# Patient Record
Sex: FEMALE
[De-identification: names, ages, dates, MRNs, and addresses within clinical notes are randomized per-mention and may not be internally consistent; named-entity substitution may affect disease eponyms.]

---

## 2017-06-12 ENCOUNTER — APPOINTMENT (OUTPATIENT)
Dept: OTOLARYNGOLOGY | Facility: CLINIC | Age: 37
End: 2017-06-12

## 2017-06-12 PROBLEM — Z00.00 ENCOUNTER FOR PREVENTIVE HEALTH EXAMINATION: Status: ACTIVE | Noted: 2017-06-12

## 2019-12-12 ENCOUNTER — OFFICE VISIT (OUTPATIENT)
Dept: URGENT CARE | Facility: CLINIC | Age: 39
End: 2019-12-12
Payer: COMMERCIAL

## 2019-12-12 ENCOUNTER — APPOINTMENT (OUTPATIENT)
Dept: RADIOLOGY | Facility: CLINIC | Age: 39
End: 2019-12-12
Payer: COMMERCIAL

## 2019-12-12 VITALS
RESPIRATION RATE: 16 BRPM | OXYGEN SATURATION: 100 % | HEART RATE: 105 BPM | SYSTOLIC BLOOD PRESSURE: 106 MMHG | DIASTOLIC BLOOD PRESSURE: 70 MMHG | TEMPERATURE: 98.3 F | WEIGHT: 132 LBS | BODY MASS INDEX: 20.72 KG/M2 | HEIGHT: 67 IN

## 2019-12-12 DIAGNOSIS — R07.89 CHEST DISCOMFORT: ICD-10-CM

## 2019-12-12 DIAGNOSIS — J02.9 SORE THROAT: Primary | ICD-10-CM

## 2019-12-12 LAB — S PYO AG THROAT QL: NEGATIVE

## 2019-12-12 PROCEDURE — 99204 OFFICE O/P NEW MOD 45 MIN: CPT | Performed by: PHYSICIAN ASSISTANT

## 2019-12-12 PROCEDURE — 71046 X-RAY EXAM CHEST 2 VIEWS: CPT

## 2019-12-12 PROCEDURE — 87070 CULTURE OTHR SPECIMN AEROBIC: CPT | Performed by: PHYSICIAN ASSISTANT

## 2019-12-12 PROCEDURE — 87880 STREP A ASSAY W/OPTIC: CPT | Performed by: PHYSICIAN ASSISTANT

## 2019-12-12 NOTE — PROGRESS NOTES
[facility]        NAME: Pricilla Goldberg is a 42 y o  female  : 1980    MRN: [id number]  DATE: 2019  TIME: 6:33 PM    Assessment and Plan   Sore throat [J02 9]  1  Sore throat  Throat culture    POCT rapid strepA   2  Chest discomfort  XR chest pa & lateral         Patient Instructions     Patient Instructions   1  Sore throat/Chest discomfort  -Rapid strep is negative and throat culture is pending  -CXR as reviewed by myself is negative  -Patient declines EKG at this time  -Please go over to the family practice to make a follow-up appointment    Go to ER with worsening symptoms, fever, worsening pain, shortness of breath, difficulty swallowing or any signs of distress     Follow up with PCP in 3-5 days  Proceed to  ER if symptoms worsen  Chief Complaint     Chief Complaint   Patient presents with    Sore Throat     x 2 weeks    Generalized Body Aches     x 1 week, c/o chest pain and body aches         History of Present Illness       Patient is a 45-year-old female who presents today for evaluation of a sore throat and centralized chest discomfort that has been intermittent for the past 2 weeks  Patient states that when she was pregnant 2 years ago, she was diagnosed with a throat infection however she states that it was never treated with antibiotics because she was pregnant at the time  Patient states that she never followed up with her ENT doctor afterward  Patient states that since the end of October, she has been having the throat discomfort again which has gotten worse over the past 2 weeks  She also admits to having intermittent a body aches as well  Patient states that she just moved here from [place] a couple months ago and does not currently have a primary care physician  She is requesting blood work at this time  Patient denies chance of pregnancy  Review of Systems   Review of Systems   Constitutional: Negative for chills and fever     HENT: Positive for sore throat  Negative for ear pain and rhinorrhea  Eyes: Negative for visual disturbance  Respiratory: Negative for shortness of breath  Cardiovascular: Positive for chest pain  Negative for palpitations  Gastrointestinal: Negative for abdominal pain  Musculoskeletal: Negative for arthralgias  Neurological: Negative for headaches  All other systems reviewed and are negative  Current Medications     No current outpatient medications on file  Current Allergies     Allergies as of 12/12/2019 - Reviewed 12/12/2019   Allergen Reaction Noted    Ciprofloxacin Hives 12/12/2019            The following portions of the patient's history were reviewed and updated as appropriate: allergies, current medications, past family history, past medical history, past social history, past surgical history and problem list      History reviewed  No pertinent past medical history  Past Surgical History:   Procedure Laterality Date    APPENDECTOMY         History reviewed  No pertinent family history  Medications have been verified  Objective   /70   Pulse 105   Temp 98 3 °F (36 8 °C) (Temporal)   Resp 16   Ht 5' 7" (1 702 m)   Wt 59 9 kg (132 lb)   SpO2 100%   Breastfeeding? Yes   BMI 20 67 kg/m²        Physical Exam     Physical Exam   Constitutional: She is oriented to person, place, and time  She appears well-developed and well-nourished  No distress  HENT:   Head: Normocephalic and atraumatic  Right Ear: Tympanic membrane and ear canal normal    Left Ear: Tympanic membrane and ear canal normal    Mouth/Throat: Uvula is midline, oropharynx is clear and moist and mucous membranes are normal  No uvula swelling  No oropharyngeal exudate, posterior oropharyngeal edema or posterior oropharyngeal erythema  Eyes: Pupils are equal, round, and reactive to light  EOM are normal    Neck: Normal range of motion  Cardiovascular: Normal rate, regular rhythm and normal heart sounds  Pulmonary/Chest: Effort normal and breath sounds normal        Lymphadenopathy:     She has no cervical adenopathy  Neurological: She is alert and oriented to person, place, and time  Skin: Skin is warm and dry  Psychiatric: She has a normal mood and affect  Nursing note and vitals reviewed

## 2019-12-12 NOTE — PATIENT INSTRUCTIONS
1  Sore throat/Chest discomfort  -Rapid strep is negative and throat culture is pending  -CXR as reviewed by myself is negative  -Patient declines EKG at this time  -Please go over to the family practice to make a follow-up appointment    Go to ER with worsening symptoms, fever, worsening pain, shortness of breath, difficulty swallowing or any signs of distress

## 2019-12-14 LAB — BACTERIA THROAT CULT: NORMAL

## 2021-03-09 ENCOUNTER — EVALUATION (OUTPATIENT)
Dept: PHYSICAL THERAPY | Facility: CLINIC | Age: 41
End: 2021-03-09
Payer: COMMERCIAL

## 2021-03-09 DIAGNOSIS — M94.0 COSTOCHONDRITIS: ICD-10-CM

## 2021-03-09 DIAGNOSIS — G58.8 INTERCOSTAL NEURALGIA: Primary | ICD-10-CM

## 2021-03-09 DIAGNOSIS — M54.6 ACUTE THORACIC BACK PAIN, UNSPECIFIED BACK PAIN LATERALITY: ICD-10-CM

## 2021-03-09 PROCEDURE — 97140 MANUAL THERAPY 1/> REGIONS: CPT | Performed by: PHYSICAL THERAPIST

## 2021-03-09 PROCEDURE — 97110 THERAPEUTIC EXERCISES: CPT | Performed by: PHYSICAL THERAPIST

## 2021-03-09 PROCEDURE — 97161 PT EVAL LOW COMPLEX 20 MIN: CPT | Performed by: PHYSICAL THERAPIST

## 2021-03-09 NOTE — LETTER
March 10, 2021    Eugenio Agosto ticketea    Patient: Pricilla Goldberg   YOB: 1980   Date of Visit: 3/9/2021     Encounter Diagnosis     ICD-10-CM    1  Intercostal neuralgia  G58 8    2  Costochondritis  M94 0    3  Acute thoracic back pain, unspecified back pain laterality  M54 6        Dear Dr Shannon Workman:    Thank you for your recent referral of Pricilla Goldberg  Please review the attached evaluation summary from [facility] recent visit  Please verify that you agree with the plan of care by signing the attached order  If you have any questions or concerns, please do not hesitate to call  I sincerely appreciate the opportunity to share in the care of one of your patients and hope to have another opportunity to work with you in the near future  Sincerely,    Nicolas Valles, PT      Referring Provider:      I certify that I have read the below Plan of Care and certify the need for these services furnished under this plan of treatment while under my care  Eugenio Agosto ticketea  Via Fax: [phone]          PT Evaluation     Today's date: 3/9/2021  Patient name: Pricilla Goldberg  : 1980  MRN: [id number]  Referring provider: Laron Harrell  Dx:   Encounter Diagnosis     ICD-10-CM    1  Intercostal neuralgia  G58 8    2  Costochondritis  M94 0    3  Acute thoracic back pain, unspecified back pain laterality  M54 6                   Assessment  Assessment details: Pricilla Goldberg is a 47 y o  female presenting as an outpatient to [place] [address] PT w/ c/o t/s and intercostal/anterior rib pain  In addition to pain, pt presents w/ s/s of 1st rib impingement syndrome, hypertonicity of surrounding musculature, and decreased ROMsignificantly limiting pt's functional ability  Pt will benefit from skilled PT services to address the above deficits in order to max function to allow pt to achieve goals in PT    Thank you for the referral of this pt  Impairments: abnormal muscle tone, abnormal or restricted ROM, activity intolerance, impaired physical strength, lacks appropriate home exercise program and pain with function  Barriers to therapy: High co-pay may limit frequency of tx  Understanding of Dx/Px/POC: good   Prognosis: good    Goals  ST  Pain decreased by 25% in 4-6 weeks  2  ROM increased by 25% in 4-6 weeks  LT  Decrease pain to 1-2/10 at worst by d/c   2  Increase ROM to [facility] for all deficient movements by d/c   3  IADL performance increased to max function by d/c   4  Recreational performance increased to max function by d/c  Plan  Planned modality interventions: cryotherapy and TENS  Other planned modality interventions: other modalities PRN  Planned therapy interventions: abdominal trunk stabilization, ADL retraining, IADL retraining, flexibility, functional ROM exercises, graded exercise, home exercise program, manual therapy, joint mobilization, neuromuscular re-education, patient education, postural training, strengthening, therapeutic exercise and therapeutic activities  Other planned therapy interventions: other interventions PRN  Frequency: 1-2x/week  Duration in weeks: 6  Plan of Care beginning date: 3/9/2021  Plan of Care expiration date: 2021  Treatment plan discussed with: patient        Subjective Evaluation    History of Present Illness  Date of onset: 2021  Mechanism of injury: Pt reports having t/s and intercostal/anterior rib  Pain  States that pain started in t/s during acupuncture which wrapped into anterior thoracic area just below breast       She reports that she started to feel tightness along anterior ribs which began approximately 4 days following acupuncture  She reports that pain feels like nerve pain  Pt reports intermittently feeling "mild tingling" into b/l UE usually if sleeping in supine or s/l     Pain  Pain scale: anterior rib: 3/10; t/s: 5/10   Pain scale at highest: anterior rib: 7/10; t/s: 5/10  Location: anterior ribs/intercostal space and t/s  Relieving factors: heat and medications (ibuprofen 600 mg 2x/day; muscle relaxer)  Exacerbated by: sitting > few minutes, standing > 15-20 minutes, walking, sleeping, lifting  Social Support  Lives with: spouse (3 kids)    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)  Patient Goals  Patient goals for therapy: decreased pain          Objective     Active Range of Motion   Cervical/Thoracic Spine       Cervical    Subcranial retraction:  WFL   Flexion: 56 degrees   Extension: 42 degrees      Left lateral flexion:  WFL  Right lateral flexion:  WFL  Left rotation: Neck active rotation left: *R c/s discomfort  Restriction level: minimal  Right rotation:  [facility]    Thoracic    Flexion: Active thoracic flexion: *increased anterior rib pain  Restriction level: minimal  Extension:  Restriction level: maximal  Left rotation:  Restriction level: moderate  Right rotation: Active right thoracic rotation: *min discomfort in t/s    Restriction level: minimal    Additional Active Range of Motion Details    Gross Shoulder AROM (R/L):   Flexion: WFL/WFL  Abduction: WFL/FWL  IR: WFL/WFL  ER: WFL/WFL *t/s pain      General Comments:      Cervical/Thoracic Comments  Palpation: Hypertonicity/Tenderness w/ palpation to LUT worse vs RUT and b/l t/s psp    c/s special tests:   sharp-monie: negative  vertebral artery: negative  spurlings: negative  Shelia's: positive pain/restriction    Spring Testing: positive hypomobility/tenderness t/o t/s         Daily Treatment Diary    EPO: 4/20/2021  Precautions: None  Co- Morbidities: Hx of panic attack    Manuals 3/9            P/a mobs to t/s Grade V            TPR to b/l t/s psp NV            1st rib MWM NV            Total Time 8'            There Ex    HEP instruct/handout 8'            T/s rotations             T/s extension over 1/2 foam roller             Extension stretch over pball             pec stretch             Neuro-Re-ed    DLS: isometric abdominals- TA             DLS: fall outs w/ TA contraction             DLS: marches w/ TA contraction             Pball PF recruitment                                                                 Ther Activity                              Gait Training                              Modalities    CP PRN Home if needed

## 2021-03-09 NOTE — PROGRESS NOTES
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, hypertonicity of surrounding musculature, and decreased ROMsignificantly limiting pt's functional ability  Pt will benefit from skilled PT services to address the above deficits in order to max function to allow pt to achieve goals in PT  Thank you for the referral of this pt  Impairments: abnormal muscle tone, abnormal or restricted ROM, activity intolerance, impaired physical strength, lacks appropriate home exercise program and pain with function  Barriers to therapy: High co-pay may limit frequency of tx  Understanding of Dx/Px/POC: good   Prognosis: good    Goals  ST  Pain decreased by 25% in 4-6 weeks  2  ROM increased by 25% in 4-6 weeks  LT  Decrease pain to 1-2/10 at worst by d/c   2  Increase ROM to [facility] for all deficient movements by d/c   3  IADL performance increased to max function by d/c   4  Recreational performance increased to max function by d/c        Plan  Planned modality interventions: cryotherapy and TENS  Other planned modality interventions: other modalities PRN  Planned therapy interventions: abdominal trunk stabilization, ADL retraining, IADL retraining, flexibility, functional ROM exercises, graded exercise, home exercise program, manual therapy, joint mobilization, neuromuscular re-education, patient education, postural training, strengthening, therapeutic exercise and therapeutic activities  Other planned therapy interventions: other interventions PRN  Frequency: 1-2x/week  Duration in weeks: 6  Plan of Care beginning date: 3/9/2021  Plan of Care expiration date: 4/20/2021  Treatment plan discussed with: patient        Subjective Evaluation    History of Present Illness  Date of onset: 2/4/2021  Mechanism of injury: Pt reports having t/s and intercostal/anterior rib  Pain  States that pain started in t/s during acupuncture which wrapped into anterior thoracic area just below breast       She reports that she started to feel tightness along anterior ribs which began approximately 4 days following acupuncture  She reports that pain feels like nerve pain  Pt reports intermittently feeling "mild tingling" into b/l UE usually if sleeping in supine or s/l  Pain  Pain scale: anterior rib: 3/10; t/s: 5/10  Pain scale at highest: anterior rib: 7/10; t/s: 5/10  Location: anterior ribs/intercostal space and t/s  Relieving factors: heat and medications (ibuprofen 600 mg 2x/day; muscle relaxer)  Exacerbated by: sitting > few minutes, standing > 15-20 minutes, walking, sleeping, lifting  Social Support  Lives with: spouse (3 kids)    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)  Patient Goals  Patient goals for therapy: decreased pain          Objective     Active Range of Motion   Cervical/Thoracic Spine       Cervical    Subcranial retraction:  WFL   Flexion: 56 degrees   Extension: 42 degrees      Left lateral flexion:  WFL  Right lateral flexion:  WFL  Left rotation: Neck active rotation left: *R c/s discomfort  Restriction level: minimal  Right rotation:  [facility]    Thoracic    Flexion: Active thoracic flexion: *increased anterior rib pain  Restriction level: minimal  Extension:  Restriction level: maximal  Left rotation:  Restriction level: moderate  Right rotation: Active right thoracic rotation: *min discomfort in t/s    Restriction level: minimal    Additional Active Range of Motion Details    Gross Shoulder AROM (R/L):   Flexion: WFL/WFL  Abduction: WFL/FWL  IR: WFL/WFL  ER: WFL/WFL *t/s pain      General Comments:      Cervical/Thoracic Comments  Palpation: Hypertonicity/Tenderness w/ palpation to LUT worse vs RUT and b/l t/s psp    c/s special tests:   sharp-monie: negative  vertebral artery: negative  spurlings: negative  Shelia's: positive pain/restriction    Spring Testing: positive hypomobility/tenderness t/o t/s         Daily Treatment Diary    EPOC: 4/20/2021  Precautions: None  Co- Morbidities: Hx of panic attack    Manuals 3/9            P/a mobs to t/s Grade V            TPR to b/l t/s psp NV            1st rib MWM NV            Total Time 8'            There Ex    HEP instruct/handout 8'            T/s rotations             T/s extension over 1/2 foam roller             Extension stretch over pball             pec stretch             Neuro-Re-ed    DLS: isometric abdominals- TA             DLS: fall outs w/ TA contraction             DLS: marches w/ TA contraction             Pball PF recruitment                                                                 Ther Activity                              Gait Training                              Modalities    CP PRN Home if needed

## 2021-03-10 ENCOUNTER — TRANSCRIBE ORDERS (OUTPATIENT)
Dept: PHYSICAL THERAPY | Facility: CLINIC | Age: 41
End: 2021-03-10

## 2021-03-10 DIAGNOSIS — G58.8 INTERCOSTAL NEURALGIA: Primary | ICD-10-CM

## 2021-03-11 ENCOUNTER — OFFICE VISIT (OUTPATIENT)
Dept: PHYSICAL THERAPY | Facility: CLINIC | Age: 41
End: 2021-03-11
Payer: COMMERCIAL

## 2021-03-11 DIAGNOSIS — M94.0 COSTOCHONDRITIS: ICD-10-CM

## 2021-03-11 DIAGNOSIS — M54.6 ACUTE THORACIC BACK PAIN, UNSPECIFIED BACK PAIN LATERALITY: ICD-10-CM

## 2021-03-11 DIAGNOSIS — G58.8 INTERCOSTAL NEURALGIA: Primary | ICD-10-CM

## 2021-03-11 PROCEDURE — 97140 MANUAL THERAPY 1/> REGIONS: CPT | Performed by: PHYSICAL THERAPIST

## 2021-03-11 PROCEDURE — 97112 NEUROMUSCULAR REEDUCATION: CPT | Performed by: PHYSICAL THERAPIST

## 2021-03-11 PROCEDURE — 97110 THERAPEUTIC EXERCISES: CPT | Performed by: PHYSICAL THERAPIST

## 2021-03-11 NOTE — PROGRESS NOTES
Daily Note     Today's date: 3/11/2021  Patient name: Marie Mora  : 1980  MRN: [id number]  Referring provider: Juan Pina  Dx:   Encounter Diagnosis     ICD-10-CM    1  Intercostal neuralgia  G58 8    2  Costochondritis  M94 0    3  Acute thoracic back pain, unspecified back pain laterality  M54 6                   Subjective: Pt reports compliance w/ HEP and states that stretches seem to help anterior rib pain  Objective: See treatment diary below      Assessment: Initiated pt's plan of care this visit w/ good tolerance  Pt did not c/o pain t/o exercises, but did require extensive cueing to avoid UT/chest compensation during TA contractions and diaphragmatic breathing  Improved technique w/ practice  Pt w/ discomfort during manuals which resolved w/ release of muscle tension  Pt will continue to benefit from core stab/breathing ex and learning to dissociate upper trunk from lower trunk w/ activity  Plan: Continue per plan of care        Daily Treatment Diary    EPOC: 2021  Precautions: None  Co- Morbidities: Hx of panic attack    Manuals 3/9 3/11           P/a mobs to t/s Grade V RB- Grade V           TPR to b/l t/s psp NV RB           1st rib MWM NV RB           Total Time 8' 14'           There Ex    HEP instruct/handout 8'            T/s rotations  1-2"x10           T/s extension over 1/2 foam roller  10"x10           Extension stretch over pball             pec stretch  30"x3           Neuro-Re-ed    DLS: isometric abdominals- TA  10"x10           DLS: fall outs w/ TA contraction  10x ea b/l            DLS: marches w/ TA contraction  10x ea b/l           Pball PF recruitment  nv           Diaphragmatic breathing  20x                                                  Ther Activity                              Gait Training                              Modalities    CP PRN Home if needed

## 2021-03-12 ENCOUNTER — APPOINTMENT (OUTPATIENT)
Dept: PHYSICAL THERAPY | Facility: CLINIC | Age: 41
End: 2021-03-12
Payer: COMMERCIAL

## 2021-03-15 ENCOUNTER — OFFICE VISIT (OUTPATIENT)
Dept: PHYSICAL THERAPY | Facility: CLINIC | Age: 41
End: 2021-03-15
Payer: COMMERCIAL

## 2021-03-15 DIAGNOSIS — G58.8 INTERCOSTAL NEURALGIA: Primary | ICD-10-CM

## 2021-03-15 DIAGNOSIS — M54.6 ACUTE THORACIC BACK PAIN, UNSPECIFIED BACK PAIN LATERALITY: ICD-10-CM

## 2021-03-15 DIAGNOSIS — M94.0 COSTOCHONDRITIS: ICD-10-CM

## 2021-03-15 PROCEDURE — 97110 THERAPEUTIC EXERCISES: CPT | Performed by: PHYSICAL THERAPIST

## 2021-03-15 PROCEDURE — 97112 NEUROMUSCULAR REEDUCATION: CPT | Performed by: PHYSICAL THERAPIST

## 2021-03-15 PROCEDURE — 97140 MANUAL THERAPY 1/> REGIONS: CPT | Performed by: PHYSICAL THERAPIST

## 2021-03-15 NOTE — PROGRESS NOTES
Daily Note     Today's date: 3/15/2021  Patient name: Concepcion Piña  : 1980  MRN: [id number]  Referring provider: Francisco Choe  Dx:   Encounter Diagnosis     ICD-10-CM    1  Intercostal neuralgia  G58 8    2  Costochondritis  M94 0    3  Acute thoracic back pain, unspecified back pain laterality  M54 6                   Subjective: Pt frustrated that pain is improved w/ HEP, but does not remain afterwards  Objective: See treatment diary below      Assessment: Added TPR to intercostals especially following lower ribs (L more focused on vs R)  Improved tissue tone/pain afterwards  Exercises tolerated well  Pt reports feeling better immediately post tx session, but reports that she will have to monitor response later as that is usually when she would experience pain  Plan: Continue per plan of care        Daily Treatment Diary    EPOC: 2021  Precautions: None  Co- Morbidities: Hx of panic attack    Manuals 3/9 3/11 3/15          P/a mobs to t/s Grade V RB- Grade V RB- grade V          TPR to b/l t/s psp NV RB RB          1st rib MWM NV RB RB          TPR to intercostals   RB          Assisted Breathing-opening rib cage   RB          Total Time 8' 14' 27'          There Ex    HEP instruct/handout 8'            T/s rotations  1-2"x10 1-2"x10          T/s extension over 1/2 foam roller  10"x10 10"x10          Extension stretch over pball             pec stretch  30"x3 30"x3          Neuro-Re-ed    UBE- retro   5'          DLS: isometric abdominals- TA  10"x10 NP resume nv          DLS: fall outs w/ TA contraction  10x ea b/l  NP- resum env          DLS: marches w/ TA contraction  10x ea b/l NP- resume nv          Pball PF recruitment  nv NV          Diaphragmatic breathing  20x 20x                                                 Ther Activity                              Gait Training                              Modalities    CP PRN Home if needed

## 2021-03-18 ENCOUNTER — OFFICE VISIT (OUTPATIENT)
Dept: PHYSICAL THERAPY | Facility: CLINIC | Age: 41
End: 2021-03-18
Payer: COMMERCIAL

## 2021-03-18 DIAGNOSIS — G58.8 INTERCOSTAL NEURALGIA: Primary | ICD-10-CM

## 2021-03-18 DIAGNOSIS — M94.0 COSTOCHONDRITIS: ICD-10-CM

## 2021-03-18 DIAGNOSIS — M54.6 ACUTE THORACIC BACK PAIN, UNSPECIFIED BACK PAIN LATERALITY: ICD-10-CM

## 2021-03-18 PROCEDURE — 97112 NEUROMUSCULAR REEDUCATION: CPT | Performed by: PHYSICAL THERAPIST

## 2021-03-18 PROCEDURE — 97110 THERAPEUTIC EXERCISES: CPT | Performed by: PHYSICAL THERAPIST

## 2021-03-18 PROCEDURE — 97140 MANUAL THERAPY 1/> REGIONS: CPT | Performed by: PHYSICAL THERAPIST

## 2021-03-18 NOTE — PROGRESS NOTES
Daily Note     Today's date: 3/18/2021  Patient name: Marc Cesar  : 1980  MRN: [id number]  Referring provider: Anne-Marie Saravia  Dx:   Encounter Diagnosis     ICD-10-CM    1  Intercostal neuralgia  G58 8    2  Costochondritis  M94 0    3  Acute thoracic back pain, unspecified back pain laterality  M54 6                   Subjective: Pt reports getting relief for approx  1 5 days after LV  Objective: See treatment diary below      Assessment: Improved pain and tissue tone post manuals this visit  Pt reports feeling less "tightness" across chest/diaphragm afterwards  Pt w/ better ability to dissociate lower trunk from upper trunk this visit noticeable w/ diaphragmatic breathing exercise and TA contractions  Pt instructed to perform pec stretch at home due to time constraints  Plan: Continue per plan of care        Daily Treatment Diary    EPOC: 2021  Precautions: None  Co- Morbidities: Hx of panic attack    Manuals 3/9 3/11 3/15 3/18         P/a mobs to t/s Grade V RB- Grade V RB- grade V RB Grade V (pt consent given)         TPR to b/l t/s psp NV RB RB RB         1st rib MWM NV RB RB RB         TPR to intercostals   RB RB         Assisted Breathing-opening rib cage   RB RB         Total Time 8' 14' 27' 28'         There Ex    HEP instruct/handout 8'            T/s rotations  1-2"x10 1-2"x10 1-2"x10         T/s extension over 1/2 foam roller  10"x10 10"x10 10"x10         Extension stretch over pball             pec stretch  30"x3 30"x3 NP- resume nv         Neuro-Re-ed    UBE- retro   5' 5'         DLS: isometric abdominals- TA  10"x10 NP resume nv T/o TE         DLS: fall outs w/ TA contraction  10x ea b/l  NP- resum env 10x ea          DLS: marches w/ TA contraction  10x ea b/l NP- resume nv NV         Pball PF recruitment  nv NV NV         Pball pelvic mobility    NV         Diaphragmatic breathing  20x 20x 20x                                                Ther Activity Gait Training                              Modalities    CP PRN Home if needed

## 2021-03-19 ENCOUNTER — APPOINTMENT (OUTPATIENT)
Dept: PHYSICAL THERAPY | Facility: CLINIC | Age: 41
End: 2021-03-19
Payer: COMMERCIAL

## 2021-03-23 ENCOUNTER — OFFICE VISIT (OUTPATIENT)
Dept: PHYSICAL THERAPY | Facility: CLINIC | Age: 41
End: 2021-03-23
Payer: COMMERCIAL

## 2021-03-23 DIAGNOSIS — M94.0 COSTOCHONDRITIS: ICD-10-CM

## 2021-03-23 DIAGNOSIS — G58.8 INTERCOSTAL NEURALGIA: Primary | ICD-10-CM

## 2021-03-23 DIAGNOSIS — M54.6 ACUTE THORACIC BACK PAIN, UNSPECIFIED BACK PAIN LATERALITY: ICD-10-CM

## 2021-03-23 PROCEDURE — 97110 THERAPEUTIC EXERCISES: CPT | Performed by: PHYSICAL THERAPIST

## 2021-03-23 PROCEDURE — 97140 MANUAL THERAPY 1/> REGIONS: CPT | Performed by: PHYSICAL THERAPIST

## 2021-03-23 NOTE — PROGRESS NOTES
Daily Note     Today's date: 3/23/2021  Patient name: Aarti Caba  : 1980  MRN: [id number]  Referring provider: Chloé Gonzales  Dx:   Encounter Diagnosis     ICD-10-CM    1  Intercostal neuralgia  G58 8    2  Costochondritis  M94 0    3  Acute thoracic back pain, unspecified back pain laterality  M54 6                   Subjective: Pt states that relief was not as significant as it was at [facility]  She has f/u telehealth visit today following tx session  She states that she may have MRI done  Also instructed pt to inform MD that the tight feeling in diaphragm/abdomen was present during pregnancy in case other imaging may want to be done; although she reports this feeling was much less severe at that time  Objective: See treatment diary below      Assessment: applied MHP to anterior ribs after active w/u this visit  Pt tolerated well  Focused on manuals afterwards - pt w/ improved tissue tone and felt good post tx session  However, she states that the true test is when she sits in her car  Plan: Continue per plan of care        Daily Treatment Diary    EPOC: 2021  Precautions: None  Co- Morbidities: Hx of panic attack    Manuals 3/9 3/11 3/15 3/18 3/23        P/a mobs to t/s Grade V RB- Grade V RB- grade V RB Grade V (pt consent given) RB Grade V (pt consent given)        TPR to b/l t/s psp NV RB RB RB RB        1st rib MWM NV RB RB RB NP        TPR to intercostals   RB RB RB        Assisted Breathing-opening rib cage   RB RB NP        Total Time 8' 14' 27' 28' 26'        There Ex    HEP instruct/handout/pt ed 8'    5' on t/s anatomy, pain/spasm/pain s cycles, expectations w/ MRI        T/s rotations  1-2"x10 1-2"x10 1-2"x10         T/s extension over 1/2 foam roller  10"x10 10"x10 10"x10         Extension stretch over pball             pec stretch  30"x3 30"x3 NP- resume nv 30"x3        Neuro-Re-ed    UBE- retro   5' 5' 5'        DLS: isometric abdominals- TA  10"x10 NP resume nv T/o TE DLS: fall outs w/ TA contraction  10x ea b/l  NP- resum env 10x ea          DLS: marches w/ TA contraction  10x ea b/l NP- resume nv NV         Pball PF recruitment  nv NV NV         Pball pelvic mobility    NV         Diaphragmatic breathing  20x 20x 20x                                                Ther Activity                              Gait Training                              Modalities    CP PRN Home if needed            MHP     10' post UBE prior to [employer]

## 2021-03-25 ENCOUNTER — OFFICE VISIT (OUTPATIENT)
Dept: PHYSICAL THERAPY | Facility: CLINIC | Age: 41
End: 2021-03-25
Payer: COMMERCIAL

## 2021-03-25 DIAGNOSIS — G58.8 INTERCOSTAL NEURALGIA: Primary | ICD-10-CM

## 2021-03-25 DIAGNOSIS — M54.6 ACUTE THORACIC BACK PAIN, UNSPECIFIED BACK PAIN LATERALITY: ICD-10-CM

## 2021-03-25 DIAGNOSIS — M94.0 COSTOCHONDRITIS: ICD-10-CM

## 2021-03-25 PROCEDURE — 97110 THERAPEUTIC EXERCISES: CPT | Performed by: PHYSICAL THERAPIST

## 2021-03-25 PROCEDURE — 97140 MANUAL THERAPY 1/> REGIONS: CPT

## 2021-03-25 NOTE — PROGRESS NOTES
PT Re-Evaluation       **Progress note done by RB, ELIER**    Today's date: 3/25/2021  Patient name: Crystal Mcknight  : 1980  MRN: [id number]  Referring provider: Andria Zhao  Dx:   Encounter Diagnosis     ICD-10-CM    1  Intercostal neuralgia  G58 8    2  Costochondritis  M94 0    3  Acute thoracic back pain, unspecified back pain laterality  M54 6                   Assessment  Assessment details: Crystal Mcknight is a 47 y o  female presenting as an outpatient to [place] [address] PT w/ initial c/o t/s and intercostal/anterior rib pain  Since IE, pt has made gains in pain reduction and ROM, but continues to remain limited from max function and continues to present w/ hypertonicity of intercostals, rectus abdominis insertion points, and t/s psp/periscap area  Pt will continue to benefit from skilled PT services in order to max function to allow pt to achieve goals in PT  Thank you  Impairments: abnormal muscle tone, abnormal or restricted ROM, activity intolerance, impaired physical strength and pain with function  Barriers to therapy: High co-pay may limit frequency of tx  Understanding of Dx/Px/POC: good   Prognosis: good    Goals  ST  Pain decreased by 25% in 4-6 weeks  - Partially Met  2  ROM increased by 25% in 4-6 weeks  - Partially Met    LT  Decrease pain to 1-2/10 at worst by d/c - Not Met  2  Increase ROM to [facility] for all deficient movements by d/c -Not Met  3  IADL performance increased to max function by d/c - Partially Met  4   Recreational performance increased to max function by d/c - Not Met      Plan  Planned modality interventions: cryotherapy and TENS  Other planned modality interventions: other modalities PRN  Planned therapy interventions: abdominal trunk stabilization, ADL retraining, IADL retraining, flexibility, functional ROM exercises, graded exercise, home exercise program, manual therapy, joint mobilization, neuromuscular re-education, patient education, postural training, strengthening, therapeutic exercise and therapeutic activities  Other planned therapy interventions: other interventions PRN  Frequency: 1-2x/week  Duration in weeks: 4  Plan of Care beginning date: 3/25/2021  Plan of Care expiration date: 4/22/2021  Treatment plan discussed with: patient        Subjective Evaluation    History of Present Illness  Date of onset: 2/4/2021  Mechanism of injury: CURRENT LEVEL (3/23/2021)  Pt reports that physician did not feel that MRI was necessary since she tends to experience improvement in symptoms w/ medication (Gabapentin and Clonazepam) and skilled PT tx  Pt reports that relief of symptoms post tx sessions has varied b/w 1/2- 1 1/2 days  However, pain has been returning after that time  INITIAL LEVEL (3/9/2021)  Pt reports having t/s and intercostal/anterior rib  Pain  States that pain started in t/s during acupuncture which wrapped into anterior thoracic area just below breast       She reports that she started to feel tightness along anterior ribs which began approximately 4 days following acupuncture  She reports that pain feels like nerve pain  Pt reports intermittently feeling "mild tingling" into b/l UE usually if sleeping in supine or s/l  Pain  Pain scale: anterior rib: 3/10; t/s: 3/10  Pain scale at highest: anterior rib: 5/10; t/s: 5/10  Location: anterior ribs/intercostal space and t/s  Relieving factors: heat and medications (ibuprofen 600 mg 2x/day; muscle relaxer, TPR/massage)  Exacerbated by: sitting > few minutes, standing > 15-20 minutes, walking, sleeping, lifting, bending to don/doff shoes      Social Support  Lives with: spouse (3 kids)    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)  Patient Goals  Patient goals for therapy: decreased pain (Partially Met)          Objective     Active Range of Motion   Cervical/Thoracic Spine       Cervical    Subcranial retraction:  WFL   Flexion: 58 degrees   Extension: 52 degrees      Left lateral flexion:  WFL  Right lateral flexion:  WFL  Left rotation: Neck active rotation left: *min L t/s discomfort   WFL Restriction level: minimal  Right rotation:  [facility]    Thoracic    Flexion:  WFL  Extension:  Restriction level: moderate  Left rotation:  Restriction level: moderate  Right rotation:  Restriction level: minimal    Additional Active Range of Motion Details    Gross Shoulder AROM (R/L):   Flexion: WFL/WFL  Abduction: WFL/FWL  IR: WFL/WFL  ER: WFL/WFL       General Comments:      Cervical/Thoracic Comments  Palpation: Hypertonicity/Tenderness w/ palpation to LUT worse vs RUT, b/l t/s psp, insertion of rectus abdominis b/l    c/s special tests:   sharp-monie: negative  vertebral artery: negative  spurlings: negative  Shelia's: positive pain/restriction    Spring Testing: positive hypomobility/tenderness t/o t/s         Daily Treatment Diary    EPOC: 4/22/2021  Precautions: None  Co- Morbidities: Hx of panic attack    Manuals 3/9 3/11 3/15 3/18 3/23 3/25       P/a mobs to t/s Grade V RB- Grade V RB- grade V RB Grade V (pt consent given) RB Grade V (pt consent given) RB grade V (pt consent given)       TPR to b/l t/s psp NV RB RB RB RB TB       1st rib MWM NV RB RB RB NP NP       TPR to intercostals   RB RB RB TB       Assisted Breathing-opening rib cage   RB RB NP NP       Total Time 8' 14' 27' 28' 26' 30'       There Ex    HEP instruct/handout/pt ed 8'    5' on t/s anatomy, pain/spasm/pain s cycles, expectations w/ MRI Re-eval 10'       T/s rotations  1-2"x10 1-2"x10 1-2"x10         T/s extension over 1/2 foam roller  10"x10 10"x10 10"x10         Extension stretch over pball             pec stretch  30"x3 30"x3 NP- resume nv 30"x3 30"x3       Neuro-Re-ed    UBE- retro   5' 5' 5' NP-resume NV       DLS: isometric abdominals- TA  10"x10 NP resume nv T/o TE         DLS: fall outs w/ TA contraction  10x ea b/l  NP- resum env 10x ea          DLS: donaldo w/ TA contraction  10x ea b/l NP- resume nv NV Pball PF recruitment  nv NV NV         Pball pelvic mobility    NV         Diaphragmatic breathing  20x 20x 20x  20x                                              Ther Activity                              Gait Training                              Modalities    CP PRN Home if needed            MHP     10' post UBE prior to manuals/stretches 10' post prior to [employer]

## 2021-03-25 NOTE — LETTER
2021    Nicki Bansal  [facility]    Patient: Sara Gonzalez   YOB: 1980   Date of Visit: 3/25/2021     Encounter Diagnosis     ICD-10-CM    1  Intercostal neuralgia  G58 8    2  Costochondritis  M94 0    3  Acute thoracic back pain, unspecified back pain laterality  M54 6        Dear Dr Kristel Krause:    Thank you for your recent referral of Sara Gonzalez  Please review the attached evaluation summary from [facility] recent visit  Please verify that you agree with the plan of care by signing the attached order  If you have any questions or concerns, please do not hesitate to call  I sincerely appreciate the opportunity to share in the care of one of your patients and hope to have another opportunity to work with you in the near future  Sincerely,    Nicolas Valles, PT      Referring Provider:      I certify that I have read the below Plan of Care and certify the need for these services furnished under this plan of treatment while under my care  Nicki Bansal  [address] [place] [address]  Via Fax: [phone]          PT Re-Evaluation       **Progress note done by RB, DPT**    Today's date: 3/25/2021  Patient name: Sara Gonzalez  : 1980  MRN: [id number]  Referring provider: Miroslava Sheridan  Dx:   Encounter Diagnosis     ICD-10-CM    1  Intercostal neuralgia  G58 8    2  Costochondritis  M94 0    3  Acute thoracic back pain, unspecified back pain laterality  M54 6                   Assessment  Assessment details: Sara Gonzalez is a 47 y o  female presenting as an outpatient to [facility] PT w/ initial c/o t/s and intercostal/anterior rib pain  Since IE, pt has made gains in pain reduction and ROM, but continues to remain limited from max function and continues to present w/ hypertonicity of intercostals, rectus abdominis insertion points, and t/s psp/periscap area   Pt will continue to benefit from skilled PT services in order to max function to allow pt to achieve goals in PT  Thank you  Impairments: abnormal muscle tone, abnormal or restricted ROM, activity intolerance, impaired physical strength and pain with function  Barriers to therapy: High co-pay may limit frequency of tx  Understanding of Dx/Px/POC: good   Prognosis: good    Goals  ST  Pain decreased by 25% in 4-6 weeks  - Partially Met  2  ROM increased by 25% in 4-6 weeks  - Partially Met    LT  Decrease pain to 1-2/10 at worst by d/c - Not Met  2  Increase ROM to [facility] for all deficient movements by d/c -Not Met  3  IADL performance increased to max function by d/c - Partially Met  4  Recreational performance increased to max function by d/c - Not Met      Plan  Planned modality interventions: cryotherapy and TENS  Other planned modality interventions: other modalities PRN  Planned therapy interventions: abdominal trunk stabilization, ADL retraining, IADL retraining, flexibility, functional ROM exercises, graded exercise, home exercise program, manual therapy, joint mobilization, neuromuscular re-education, patient education, postural training, strengthening, therapeutic exercise and therapeutic activities  Other planned therapy interventions: other interventions PRN  Frequency: 1-2x/week  Duration in weeks: 4  Plan of Care beginning date: 3/25/2021  Plan of Care expiration date: 2021  Treatment plan discussed with: patient        Subjective Evaluation    History of Present Illness  Date of onset: 2021  Mechanism of injury: CURRENT LEVEL (3/23/2021)  Pt reports that physician did not feel that MRI was necessary since she tends to experience improvement in symptoms w/ medication (Gabapentin and Clonazepam) and skilled PT tx  Pt reports that relief of symptoms post tx sessions has varied b/w 1/2- 1 1/2 days  However, pain has been returning after that time       INITIAL LEVEL (3/9/2021)  Pt reports having t/s and intercostal/anterior rib  Pain  States that pain started in t/s during acupuncture which wrapped into anterior thoracic area just below breast       She reports that she started to feel tightness along anterior ribs which began approximately 4 days following acupuncture  She reports that pain feels like nerve pain  Pt reports intermittently feeling "mild tingling" into b/l UE usually if sleeping in supine or s/l  Pain  Pain scale: anterior rib: 3/10; t/s: 3/10  Pain scale at highest: anterior rib: 5/10; t/s: 5/10  Location: anterior ribs/intercostal space and t/s  Relieving factors: heat and medications (ibuprofen 600 mg 2x/day; muscle relaxer, TPR/massage)  Exacerbated by: sitting > few minutes, standing > 15-20 minutes, walking, sleeping, lifting, bending to don/doff shoes  Social Support  Lives with: spouse (3 kids)    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)  Patient Goals  Patient goals for therapy: decreased pain (Partially Met)          Objective     Active Range of Motion   Cervical/Thoracic Spine       Cervical    Subcranial retraction:  WFL   Flexion: 58 degrees   Extension: 52 degrees      Left lateral flexion:  WFL  Right lateral flexion:  WFL  Left rotation: Neck active rotation left: *min L t/s discomfort   WFL Restriction level: minimal  Right rotation:  [facility]    Thoracic    Flexion:  WFL  Extension:  Restriction level: moderate  Left rotation:  Restriction level: moderate  Right rotation:  Restriction level: minimal    Additional Active Range of Motion Details    Gross Shoulder AROM (R/L):   Flexion: WFL/WFL  Abduction: WFL/FWL  IR: WFL/WFL  ER: WFL/WFL       General Comments:      Cervical/Thoracic Comments  Palpation: Hypertonicity/Tenderness w/ palpation to LUT worse vs RUT, b/l t/s psp, insertion of rectus abdominis b/l    c/s special tests:   sharp-monie: negative  vertebral artery: negative  spurlings: negative  Shelia's: positive pain/restriction    Spring Testing: positive hypomobility/tenderness t/o t/s         Daily Treatment Diary    EPOC: 4/22/2021  Precautions: None  Co- Morbidities: Hx of panic attack    Manuals 3/9 3/11 3/15 3/18 3/23 3/25       P/a mobs to t/s Grade V RB- Grade V RB- grade V RB Grade V (pt consent given) RB Grade V (pt consent given) RB grade V (pt consent given)       TPR to b/l t/s psp NV RB RB RB RB TB       1st rib MWM NV RB RB RB NP NP       TPR to intercostals   RB RB RB TB       Assisted Breathing-opening rib cage   RB RB NP NP       Total Time 8' 14' 27' 28' 26' 30'       There Ex    HEP instruct/handout/pt ed 8'    5' on t/s anatomy, pain/spasm/pain s cycles, expectations w/ MRI Re-eval 10'       T/s rotations  1-2"x10 1-2"x10 1-2"x10         T/s extension over 1/2 foam roller  10"x10 10"x10 10"x10         Extension stretch over pball             pec stretch  30"x3 30"x3 NP- resume nv 30"x3 30"x3       Neuro-Re-ed    UBE- retro   5' 5' 5' NP-resume NV       DLS: isometric abdominals- TA  10"x10 NP resume nv T/o TE         DLS: fall outs w/ TA contraction  10x ea b/l  NP- resum env 10x ea          DLS: marches w/ TA contraction  10x ea b/l NP- resume nv NV         Pball PF recruitment  nv NV NV         Pball pelvic mobility    NV         Diaphragmatic breathing  20x 20x 20x  20x                                              Ther Activity                              Gait Training                              Modalities    CP PRN Home if needed            MHP     10' post UBE prior to manuals/stretches 10' post prior to [employer] signed by Victor Hugo Capone PT at 3/25/2021  7:01 PM:  I supervised the visit  We discussed the case to ensure appropriate continuation and progression of care and I reviewed the documentation

## 2021-03-26 ENCOUNTER — TRANSCRIBE ORDERS (OUTPATIENT)
Dept: PHYSICAL THERAPY | Facility: CLINIC | Age: 41
End: 2021-03-26

## 2021-03-26 DIAGNOSIS — G58.8 INTERCOSTAL NEURALGIA: Primary | ICD-10-CM

## 2021-03-30 ENCOUNTER — OFFICE VISIT (OUTPATIENT)
Dept: PHYSICAL THERAPY | Facility: CLINIC | Age: 41
End: 2021-03-30
Payer: COMMERCIAL

## 2021-03-30 DIAGNOSIS — M54.6 ACUTE THORACIC BACK PAIN, UNSPECIFIED BACK PAIN LATERALITY: ICD-10-CM

## 2021-03-30 DIAGNOSIS — M94.0 COSTOCHONDRITIS: ICD-10-CM

## 2021-03-30 DIAGNOSIS — G58.8 INTERCOSTAL NEURALGIA: Primary | ICD-10-CM

## 2021-03-30 PROCEDURE — 97140 MANUAL THERAPY 1/> REGIONS: CPT | Performed by: PHYSICAL THERAPIST

## 2021-03-30 PROCEDURE — 97112 NEUROMUSCULAR REEDUCATION: CPT | Performed by: PHYSICAL THERAPIST

## 2021-03-30 NOTE — PROGRESS NOTES
Daily Note     Today's date: 3/30/2021  Patient name: Edith Mack  : 1980  MRN: [id number]  Referring provider: Joselo Shepard  Dx:   Encounter Diagnosis     ICD-10-CM    1  Intercostal neuralgia  G58 8    2  Costochondritis  M94 0    3  Acute thoracic back pain, unspecified back pain laterality  M54 6                   Subjective: Pt states that pain is relatively the same as it was by the end of last week, but reminds PT that it was an improvement overall  Objective: See treatment diary below      Assessment: Manuals/ex tolerated well today  Improved tissue tone and pain level post tx session  Added LPD and provided pt w/ YTB for home use  May consider resuming MHP NV  Plan: Continue per plan of care  Gradually progress stretching/strengthening and decrease manuals as able       Daily Treatment Diary    EPOC: 2021  Precautions: None  Co- Morbidities: Hx of panic attack    Manuals 3/9 3/11 3/15 3/18 3/23 3/25 3/30      P/a mobs to t/s Grade V RB- Grade V RB- grade V RB Grade V (pt consent given) RB Grade V (pt consent given) RB grade V (pt consent given) RB grade V (pt consent given)      TPR to b/l t/s psp NV RB RB RB RB TB RB      1st rib MWM NV RB RB RB NP NP NP      TPR to intercostals   RB RB RB TB RB      Assisted Breathing-opening rib cage   RB RB NP NP       Total Time 8' 14' 27' 28' 26' 30' 30'      There Ex    HEP instruct/handout/pt ed 8'    5' on t/s anatomy, pain/spasm/pain s cycles, expectations w/ MRI Re-eval 10'       T/s rotations  1-2"x10 1-2"x10 1-2"x10   1-2"x10 ea bl/l       T/s extension over 1/2 foam roller  10"x10 10"x10 10"x10   10"x10      Extension stretch over pball             pec stretch  30"x3 30"x3 NP- resume nv 30"x3 30"x3 NP- resume nv      Neuro-Re-ed    UBE- retro   5' 5' 5' NP-resume NV 5'      DLS: isometric abdominals- TA  10"x10 NP resume nv T/o TE         DLS: fall outs w/ TA contraction  10x ea b/l  NP- resum env 10x ea          DLS: donaldo w/ TA contraction  10x ea b/l NP- resume nv NV         Pball PF recruitment  nv NV NV   NV      Sit to stands w/ PF/TA recruitment       NV      Pball pelvic mobility    NV   NV      Diaphragmatic breathing  20x 20x 20x  20x 20x      LPD       YTB 15x                                Ther Activity                              Gait Training                              Modalities    CP PRN Home if needed            MHP     10' post UBE prior to manuals/stretches 10' post prior to [employer] NP- resume nv PRN

## 2021-04-01 ENCOUNTER — OFFICE VISIT (OUTPATIENT)
Dept: PHYSICAL THERAPY | Facility: CLINIC | Age: 41
End: 2021-04-01
Payer: COMMERCIAL

## 2021-04-01 DIAGNOSIS — M94.0 COSTOCHONDRITIS: ICD-10-CM

## 2021-04-01 DIAGNOSIS — G58.8 INTERCOSTAL NEURALGIA: Primary | ICD-10-CM

## 2021-04-01 DIAGNOSIS — M54.6 ACUTE THORACIC BACK PAIN, UNSPECIFIED BACK PAIN LATERALITY: ICD-10-CM

## 2021-04-01 PROCEDURE — 97140 MANUAL THERAPY 1/> REGIONS: CPT

## 2021-04-01 PROCEDURE — 97112 NEUROMUSCULAR REEDUCATION: CPT

## 2021-04-01 PROCEDURE — 97110 THERAPEUTIC EXERCISES: CPT

## 2021-04-01 NOTE — PROGRESS NOTES
Daily Note     Today's date: 2021  Patient name: Shima Kathleen  : 1980  MRN: [id number]  Referring provider: Olivier Hobson  Dx:   Encounter Diagnosis     ICD-10-CM    1  Intercostal neuralgia  G58 8    2  Costochondritis  M94 0    3  Acute thoracic back pain, unspecified back pain laterality  M54 6                   Subjective: Pt reports "doing better" continues to have pain to thoracic spine and intercostal musculature but reports less intensity  Objective: See treatment diary below      Assessment: Improved tissue/quality tone post manuals  Added pball PF recruitment as well as pelvic mobility on pball with good tolerance/no increases in pain; pt did need visual cues to ensure proper technique  Plan: Continue with current POC to address pt deficits        Daily Treatment Diary    EPOC: 2021  Precautions: None  Co- Morbidities: Hx of panic attack    Manuals 3/9 3/11 3/15 3/18 3/23 3/25 3/30 4/1     P/a mobs to t/s Grade V RB- Grade V RB- grade V RB Grade V (pt consent given) RB Grade V (pt consent given) RB grade V (pt consent given) RB grade V (pt consent given) TB grade V (pt consent given)     TPR to b/l t/s psp NV RB RB RB RB TB RB TB     1st rib MWM NV RB RB RB NP NP NP NP     TPR to intercostals   RB RB RB TB RB TB     Assisted Breathing-opening rib cage   RB RB NP NP       Total Time 8' 14' 27' 28' 26' 30' 30' 26'     There Ex    HEP instruct/handout/pt ed 8'    5' on t/s anatomy, pain/spasm/pain s cycles, expectations w/ MRI Re-eval 10'       T/s rotations  1-2"x10 1-2"x10 1-2"x10   1-2"x10 ea bl/l  1-2"x10 ea b/l     T/s extension over 1/2 foam roller  10"x10 10"x10 10"x10   10"x10 10"x10     Extension stretch over pball             pec stretch  30"x3 30"x3 NP- resume nv 30"x3 30"x3 NP- resume nv 30"x3     Neuro-Re-ed    UBE- retro   5' 5' 5' NP-resume NV 5' NP-resume NV     DLS: isometric abdominals- TA  10"x10 NP resume nv T/o TE         DLS: fall outs w/ TA contraction 10x ea b/l  NP- resum env 10x ea          DLS: marches w/ TA contraction  10x ea b/l NP- resume nv NV         Pball PF recruitment  nv NV NV   NV 10"x10     Sit to stands w/ PF/TA recruitment       NV NV     Pball pelvic mobility    NV   NV 15x a/p, m/l     Diaphragmatic breathing  20x 20x 20x  20x 20x 20x     LPD       YTB 15x NP-resume NV                               Ther Activity                              Gait Training                              Modalities    CP PRN Home if needed            MHP     10' post UBE prior to manuals/stretches 10' post prior to [employer] NP- resume nv PRN 5'

## 2021-04-06 ENCOUNTER — OFFICE VISIT (OUTPATIENT)
Dept: PHYSICAL THERAPY | Facility: CLINIC | Age: 41
End: 2021-04-06
Payer: COMMERCIAL

## 2021-04-06 DIAGNOSIS — G58.8 INTERCOSTAL NEURALGIA: Primary | ICD-10-CM

## 2021-04-06 DIAGNOSIS — M94.0 COSTOCHONDRITIS: ICD-10-CM

## 2021-04-06 DIAGNOSIS — M54.6 ACUTE THORACIC BACK PAIN, UNSPECIFIED BACK PAIN LATERALITY: ICD-10-CM

## 2021-04-06 PROCEDURE — 97140 MANUAL THERAPY 1/> REGIONS: CPT

## 2021-04-06 PROCEDURE — 97112 NEUROMUSCULAR REEDUCATION: CPT

## 2021-04-06 PROCEDURE — 97110 THERAPEUTIC EXERCISES: CPT

## 2021-04-06 NOTE — PROGRESS NOTES
Daily Note     Today's date: 2021  Patient name: Aurora Nageotte  : 1980  MRN: [id number]  Referring provider: Talia Patient  Dx:   Encounter Diagnosis     ICD-10-CM    1  Intercostal neuralgia  G58 8    2  Costochondritis  M94 0    3  Acute thoracic back pain, unspecified back pain laterality  M54 6                   Subjective: Pt reports continued tightness to intercostals, dull deep ache reports in thoracic spine over the weekend  Objective: See treatment diary below      Assessment: Less tightness present after manuals; tolerated exercise w/o increasing pain but did need visual cues to ensure proper pball pelvic mobility  Progress as able  Plan: Continue with current POC to address pt deficits        Daily Treatment Diary    EPOC: 2021  Precautions: None  Co- Morbidities: Hx of panic attack    Manuals 3/9 3/11 3/15 3/18 3/23 3/25 3/30 4/1 4/6    P/a mobs to t/s Grade V RB- Grade V RB- grade V RB Grade V (pt consent given) RB Grade V (pt consent given) RB grade V (pt consent given) RB grade V (pt consent given) RB grade V (pt consent given) RB-grade V (pt consent given)    TPR to b/l t/s psp NV RB RB RB RB TB RB TB TB    1st rib MWM NV RB RB RB NP NP NP NP NP    TPR to intercostals   RB RB RB TB RB TB TB    Assisted Breathing-opening rib cage   RB RB NP NP       Total Time 8' 14' 27' 28' 26' 30' 30' 26' 24'    There Ex    HEP instruct/handout/pt ed 8'    5' on t/s anatomy, pain/spasm/pain s cycles, expectations w/ MRI Re-eval 10'       T/s rotations  1-2"x10 1-2"x10 1-2"x10   1-2"x10 ea bl/l  1-2"x10 ea b/l 1-2"x10 ea b/l    T/s extension over 1/2 foam roller  10"x10 10"x10 10"x10   10"x10 10"x10 10"x10    Extension stretch over pball             pec stretch  30"x3 30"x3 NP- resume nv 30"x3 30"x3 NP- resume nv 30"x3 30"x4    Neuro-Re-ed    UBE- retro   5' 5' 5' NP-resume NV 5' NP-resume NV 5'    DLS: isometric abdominals- TA  10"x10 NP resume nv T/o TE         DLS: fall outs w/ TA contraction  10x ea b/l  NP- resum env 10x ea          DLS: marches w/ TA contraction  10x ea b/l NP- resume nv NV         Pball PF recruitment  nv NV NV   NV 10"x10 10"x10    Sit to stands w/ PF/TA recruitment       NV NV NV    Pball pelvic mobility    NV   NV 15x a/p, m/l 20x a/p, m/l    Diaphragmatic breathing  20x 20x 20x  20x 20x 20x NP-resume NV    LPD       YTB 15x NP-resume NV                               Ther Activity                              Gait Training                              Modalities    CP PRN Home if needed            MHP     10' post UBE prior to manuals/stretches 10' post prior to manuals/stretches NP- resume nv PRN 5' 5' prio to manuals

## 2021-04-08 ENCOUNTER — EVALUATION (OUTPATIENT)
Dept: PHYSICAL THERAPY | Facility: CLINIC | Age: 41
End: 2021-04-08
Payer: COMMERCIAL

## 2021-04-08 DIAGNOSIS — G58.8 INTERCOSTAL NEURALGIA: Primary | ICD-10-CM

## 2021-04-08 DIAGNOSIS — M94.0 COSTOCHONDRITIS: ICD-10-CM

## 2021-04-08 DIAGNOSIS — M54.6 ACUTE THORACIC BACK PAIN, UNSPECIFIED BACK PAIN LATERALITY: ICD-10-CM

## 2021-04-08 PROCEDURE — 97112 NEUROMUSCULAR REEDUCATION: CPT | Performed by: PHYSICAL THERAPIST

## 2021-04-08 PROCEDURE — 97140 MANUAL THERAPY 1/> REGIONS: CPT | Performed by: PHYSICAL THERAPIST

## 2021-04-08 PROCEDURE — 97110 THERAPEUTIC EXERCISES: CPT | Performed by: PHYSICAL THERAPIST

## 2021-04-08 NOTE — LETTER
2021    Brianna Butterfield   [facility]    Patient: Chavo Jarvis   YOB: 1980   Date of Visit: 2021     Encounter Diagnosis     ICD-10-CM    1  Intercostal neuralgia  G58 8    2  Costochondritis  M94 0    3  Acute thoracic back pain, unspecified back pain laterality  M54 6        Dear Dr Kelly Rios:    Thank you for your recent referral of Chavo Jarvis  Please review the attached evaluation summary from [facility] recent visit  Please verify that you agree with the plan of care by signing the attached order  If you have any questions or concerns, please do not hesitate to call  I sincerely appreciate the opportunity to share in the care of one of your patients and hope to have another opportunity to work with you in the near future  Sincerely,    Nicolas Valles, PT      Referring Provider:      I certify that I have read the below Plan of Care and certify the need for these services furnished under this plan of treatment while under my care  Brianna Butterfield  [facility]  Via Fax: [phone]          PT Re-Evaluation       Today's date: 2021  Patient name: Chavo Jarvis  : 1980  MRN: [id number]  Referring provider: Toby Mendenhall  Dx:   Encounter Diagnosis     ICD-10-CM    1  Intercostal neuralgia  G58 8    2  Costochondritis  M94 0    3  Acute thoracic back pain, unspecified back pain laterality  M54 6                   Assessment  Assessment details: Chavo Jarvis is a 47 y o  female presenting as an outpatient to [place] [address] PT w/ initial c/o t/s and intercostal/anterior rib pain  Since previous progress note, pt has made gains in pain reduction, ROM, and activity tolerance/function but continues to remain limited from max function  Pt will continue to benefit from skilled PT services in order to max function to allow pt to achieve goals in PT  Thank you     Impairments: abnormal muscle tone, abnormal or restricted ROM, activity intolerance, impaired physical strength and pain with function  Barriers to therapy: High co-pay may limit frequency of tx  Understanding of Dx/Px/POC: good   Prognosis: good    Goals  ST  Pain decreased by 25% in 4-6 weeks  - Partially Met  2  ROM increased by 25% in 4-6 weeks  - Partially Met    LT  Decrease pain to 1-2/10 at worst by d/c - Not Met  2  Increase ROM to [facility] for all deficient movements by d/c -Not Met  3  IADL performance increased to max function by d/c - Partially Met  4  Recreational performance increased to max function by d/c - Not Met      Plan  Planned modality interventions: cryotherapy and TENS  Other planned modality interventions: other modalities PRN  Planned therapy interventions: abdominal trunk stabilization, ADL retraining, IADL retraining, flexibility, functional ROM exercises, graded exercise, home exercise program, manual therapy, joint mobilization, neuromuscular re-education, patient education, postural training, strengthening, therapeutic exercise and therapeutic activities  Other planned therapy interventions: other interventions PRN  Frequency: 1-2x/week; 2-4 weeks  Plan of Care beginning date: 2021  Plan of Care expiration date: 2021  Treatment plan discussed with: patient        Subjective Evaluation    History of Present Illness  Date of onset: 2021  Mechanism of injury: CURRENT LEVEL (2021)  Pt reports overall feeling better since starting skilled PT tx  She reports that the "burning pain" in t/s has resolved  She recently stopped taking diclofenac as she did not feel it was helping to improve symptoms; however she realized it was helping after she stopped since pain did seem to worsen  She continues to report tightness in rectus which worsens w/ sitting for extended periods of time  However, she reports that it is less severe than it was at IE       PREVIOUS LEVEL (3/23/2021)  Pt reports that physician did not feel that MRI was necessary since she tends to experience improvement in symptoms w/ medication (Gabapentin and Clonazepam) and skilled PT tx  Pt reports that relief of symptoms post tx sessions has varied b/w 1/2- 1 1/2 days  However, pain has been returning after that time  INITIAL LEVEL (3/9/2021)  Pt reports having t/s and intercostal/anterior rib  Pain  States that pain started in t/s during acupuncture which wrapped into anterior thoracic area just below breast       She reports that she started to feel tightness along anterior ribs which began approximately 4 days following acupuncture  She reports that pain feels like nerve pain  Pt reports intermittently feeling "mild tingling" into b/l UE usually if sleeping in supine or s/l  Pain  Pain scale: anterior rib: 3-4/10; t/s: 0/10  Pain scale at highest: anterior rib: 4/10; t/s: 3-4-/10  Location: anterior ribs/intercostal space and t/s  Relieving factors: heat and medications (ibuprofen 600 mg 2x/day; muscle relaxer, TPR/massage)  Exacerbated by: sitting > few minutes, standing > 15-20 minutes, walking, sleeping, lifting, bending to don/doff shoes      Social Support  Lives with: spouse (3 kids)    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)  Patient Goals  Patient goals for therapy: decreased pain (Partially Met)          Objective     Active Range of Motion   Cervical/Thoracic Spine       Cervical    Subcranial retraction:  WFL   Flexion: 68 degrees   Extension: 54 degrees      Left lateral flexion:  WFL  Right lateral flexion:  WFL  Left rotation:  WFL  Right rotation:  [facility]    Thoracic    Flexion:  WFL  Extension:  Restriction level: moderate  Left rotation:  Restriction level: minimal  Right rotation:  Restriction level: minimal    Additional Active Range of Motion Details  Gross Shoulder AROM (R/L):   Flexion: WFL/WFL  Abduction: WFL/FWL  IR: WFL/WFL  ER: WFL/WFL       General Comments:      Cervical/Thoracic Comments  Palpation: Hypertonicity/Tenderness w/ palpation to b/l t/s psp, intercostals (R worse vs L),insertion of rectus abdominis b/l    c/s special tests (assessed at ):    sharp-monie: negative  vertebral artery: negative  spurlings: negative  Shelia's: positive pain/restriction    Spring Testing: positive hypomobility/tenderness t/o t/s         Daily Treatment Diary    EPOC: 4/22/2021  Precautions: None  Co- Morbidities: Hx of panic attack    Manuals 3/9 3/11 3/15 3/18 3/23 3/25 3/30 4/1 4/6 4/8   P/a mobs to t/s Grade V RB- Grade V RB- grade V RB Grade V (pt consent given) RB Grade V (pt consent given) RB grade V (pt consent given) RB grade V (pt consent given) RB grade V (pt consent given) RB-grade V (pt consent given) RB-grade V (pt consent given)   TPR to b/l t/s psp NV RB RB RB RB TB RB TB TB RB   1st rib MWM NV RB RB RB NP NP NP NP NP RB   TPR to intercostals   RB RB RB TB RB TB TB RB   Assisted Breathing-opening rib cage   RB RB NP NP       Total Time 8' 14' 27' 28' 26' 30' 30' 26' 24' 25'   There Ex    HEP instruct/handout/pt ed 8'    5' on t/s anatomy, pain/spasm/pain s cycles, expectations w/ MRI Re-eval 10'       Progress Note          10'   T/s rotations  1-2"x1 0 1-2"x10 1-2"x10   1-2"x10 ea bl/l  1-2"x10 ea b/l 1-2"x10 ea b/l 1-2"x10 ea b/l    T/s extension over 1/2 foam roller  10"x10 10"x10 10"x10   10"x10 10"x10 10"x10 10"x10   T/s Extension w/ pec stretch over pball          30"x3    pec stretch  30"x3 30"x3 NP- resume nv 30"x3 30"x3 NP- resume nv 30"x3 30"x4    Neuro-Re-ed    UBE- retro   5' 5' 5' NP-resume NV 5' NP-resume NV 5' 5'   DLS: isometric abdominals- TA  10"x10 NP resume nv T/o TE         DLS: fall outs w/ TA contraction  10x ea b/l  NP- resum env 10x ea          DLS: marches w/ TA contraction  10x ea b/l NP- resume nv NV         Pball PF recruitment  nv NV NV   NV 10"x10 10"x10 10"x10   Sit to stands w/ PF/TA recruitment       NV NV NV Pball pelvic mobility    NV   NV 15x a/p, m/l 20x a/p, m/l 20x a/p and m/l    Diaphragmatic breathing  20x 20x 20x  20x 20x 20x NP-resume NV    LPD       YTB 15x NP-resume NV                               Ther Activity                              Gait Training                              Modalities    CP PRN Home if needed            MHP     10' post UBE prior to manuals/stretches 10' post prior to manuals/stretches NP- resume nv PRN 5' 5' prio to manuals 10' post UBE prior to [employer]

## 2021-04-08 NOTE — PROGRESS NOTES
PT Re-Evaluation       Today's date: 2021  Patient name: Randi Pollard  : 1980  MRN: [id number]  Referring provider: Hilary Carlson  Dx:   Encounter Diagnosis     ICD-10-CM    1  Intercostal neuralgia  G58 8    2  Costochondritis  M94 0    3  Acute thoracic back pain, unspecified back pain laterality  M54 6                   Assessment  Assessment details: Randi Pollard is a 47 y o  female presenting as an outpatient to [place] [address] PT w/ initial c/o t/s and intercostal/anterior rib pain  Since previous progress note, pt has made gains in pain reduction, ROM, and activity tolerance/function but continues to remain limited from max function  Pt will continue to benefit from skilled PT services in order to max function to allow pt to achieve goals in PT  Thank you  Impairments: abnormal muscle tone, abnormal or restricted ROM, activity intolerance, impaired physical strength and pain with function  Barriers to therapy: High co-pay may limit frequency of tx  Understanding of Dx/Px/POC: good   Prognosis: good    Goals  ST  Pain decreased by 25% in 4-6 weeks  - Partially Met  2  ROM increased by 25% in 4-6 weeks  - Partially Met    LT  Decrease pain to 1-2/10 at worst by d/c - Not Met  2  Increase ROM to [facility] for all deficient movements by d/c -Not Met  3  IADL performance increased to max function by d/c - Partially Met  4   Recreational performance increased to max function by d/c - Not Met      Plan  Planned modality interventions: cryotherapy and TENS  Other planned modality interventions: other modalities PRN  Planned therapy interventions: abdominal trunk stabilization, ADL retraining, IADL retraining, flexibility, functional ROM exercises, graded exercise, home exercise program, manual therapy, joint mobilization, neuromuscular re-education, patient education, postural training, strengthening, therapeutic exercise and therapeutic activities  Other planned therapy interventions: other interventions PRN  Frequency: 1-2x/week; 2-4 weeks  Plan of Care beginning date: 4/8/2021  Plan of Care expiration date: 5/6/2021  Treatment plan discussed with: patient        Subjective Evaluation    History of Present Illness  Date of onset: 2/4/2021  Mechanism of injury: CURRENT LEVEL (4/8/2021)  Pt reports overall feeling better since starting skilled PT tx  She reports that the "burning pain" in t/s has resolved  She recently stopped taking diclofenac as she did not feel it was helping to improve symptoms; however she realized it was helping after she stopped since pain did seem to worsen  She continues to report tightness in rectus which worsens w/ sitting for extended periods of time  However, she reports that it is less severe than it was at IE  PREVIOUS LEVEL (3/23/2021)  Pt reports that physician did not feel that MRI was necessary since she tends to experience improvement in symptoms w/ medication (Gabapentin and Clonazepam) and skilled PT tx  Pt reports that relief of symptoms post tx sessions has varied b/w 1/2- 1 1/2 days  However, pain has been returning after that time  INITIAL LEVEL (3/9/2021)  Pt reports having t/s and intercostal/anterior rib  Pain  States that pain started in t/s during acupuncture which wrapped into anterior thoracic area just below breast       She reports that she started to feel tightness along anterior ribs which began approximately 4 days following acupuncture  She reports that pain feels like nerve pain  Pt reports intermittently feeling "mild tingling" into b/l UE usually if sleeping in supine or s/l  Pain  Pain scale: anterior rib: 3-4/10; t/s: 0/10  Pain scale at highest: anterior rib: 4/10; t/s: 3-4-/10    Location: anterior ribs/intercostal space and t/s  Relieving factors: heat and medications (ibuprofen 600 mg 2x/day; muscle relaxer, TPR/massage)  Exacerbated by: sitting > few minutes, standing > 15-20 minutes, walking, sleeping, lifting, bending to don/doff shoes      Social Support  Lives with: spouse (3 kids)    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)  Patient Goals  Patient goals for therapy: decreased pain (Partially Met)          Objective     Active Range of Motion   Cervical/Thoracic Spine       Cervical    Subcranial retraction:  WFL   Flexion: 68 degrees   Extension: 54 degrees      Left lateral flexion:  WFL  Right lateral flexion:  WFL  Left rotation:  WFL  Right rotation:  [facility]    Thoracic    Flexion:  WFL  Extension:  Restriction level: moderate  Left rotation:  Restriction level: minimal  Right rotation:  Restriction level: minimal    Additional Active Range of Motion Details  Gross Shoulder AROM (R/L):   Flexion: WFL/WFL  Abduction: WFL/FWL  IR: WFL/WFL  ER: WFL/WFL       General Comments:      Cervical/Thoracic Comments  Palpation: Hypertonicity/Tenderness w/ palpation to b/l t/s psp, intercostals (R worse vs L),insertion of rectus abdominis b/l    c/s special tests (assessed at ):    sharp-monie: negative  vertebral artery: negative  spurlings: negative  Shelia's: positive pain/restriction    Spring Testing: positive hypomobility/tenderness t/o t/s         Daily Treatment Diary    EPOC: 4/22/2021  Precautions: None  Co- Morbidities: Hx of panic attack    Manuals 3/9 3/11 3/15 3/18 3/23 3/25 3/30 4/1 4/6 4/8   P/a mobs to t/s Grade V RB- Grade V RB- grade V RB Grade V (pt consent given) RB Grade V (pt consent given) RB grade V (pt consent given) RB grade V (pt consent given) RB grade V (pt consent given) RB-grade V (pt consent given) RB-grade V (pt consent given)   TPR to b/l t/s psp NV RB RB RB RB TB RB TB TB RB   1st rib MWM NV RB RB RB NP NP NP NP NP RB   TPR to intercostals   RB RB RB TB RB TB TB RB   Assisted Breathing-opening rib cage   RB RB NP NP       Total Time 8' 14' 27' 28' 26' 30' 30' 26' 24' 25'   There Ex    HEP instruct/handout/pt ed 8'    5' on t/s anatomy, pain/spasm/pain s cycles, expectations w/ MRI Re-eval 10'       Progress Note          10'   T/s rotations  1-2"x1 0 1-2"x10 1-2"x10   1-2"x10 ea bl/l  1-2"x10 ea b/l 1-2"x10 ea b/l 1-2"x10 ea b/l    T/s extension over 1/2 foam roller  10"x10 10"x10 10"x10   10"x10 10"x10 10"x10 10"x10   T/s Extension w/ pec stretch over pball          30"x3    pec stretch  30"x3 30"x3 NP- resume nv 30"x3 30"x3 NP- resume nv 30"x3 30"x4    Neuro-Re-ed    UBE- retro   5' 5' 5' NP-resume NV 5' NP-resume NV 5' 5'   DLS: isometric abdominals- TA  10"x10 NP resume nv T/o TE         DLS: fall outs w/ TA contraction  10x ea b/l  NP- resum env 10x ea          DLS: marches w/ TA contraction  10x ea b/l NP- resume nv NV         Pball PF recruitment  nv NV NV   NV 10"x10 10"x10 10"x10   Sit to stands w/ PF/TA recruitment       NV NV NV    Pball pelvic mobility    NV   NV 15x a/p, m/l 20x a/p, m/l 20x a/p and m/l    Diaphragmatic breathing  20x 20x 20x  20x 20x 20x NP-resume NV    LPD       YTB 15x NP-resume NV                               Ther Activity                              Gait Training                              Modalities    CP PRN Home if needed            MHP     10' post UBE prior to manuals/stretches 10' post prior to manuals/stretches NP- resume nv PRN 5' 5' prio to manuals 10' post UBE prior to [employer]

## 2021-04-12 ENCOUNTER — TRANSCRIBE ORDERS (OUTPATIENT)
Dept: PHYSICAL THERAPY | Facility: CLINIC | Age: 41
End: 2021-04-12

## 2021-04-12 DIAGNOSIS — M94.0 COSTOCHONDRITIS: Primary | ICD-10-CM

## 2021-04-13 ENCOUNTER — OFFICE VISIT (OUTPATIENT)
Dept: PHYSICAL THERAPY | Facility: CLINIC | Age: 41
End: 2021-04-13
Payer: COMMERCIAL

## 2021-04-13 DIAGNOSIS — G58.8 INTERCOSTAL NEURALGIA: Primary | ICD-10-CM

## 2021-04-13 DIAGNOSIS — M94.0 COSTOCHONDRITIS: ICD-10-CM

## 2021-04-13 DIAGNOSIS — M54.6 ACUTE THORACIC BACK PAIN, UNSPECIFIED BACK PAIN LATERALITY: ICD-10-CM

## 2021-04-13 PROCEDURE — 97110 THERAPEUTIC EXERCISES: CPT

## 2021-04-13 PROCEDURE — 97140 MANUAL THERAPY 1/> REGIONS: CPT

## 2021-04-13 NOTE — PROGRESS NOTES
Daily Note     Today's date: 2021  Patient name: Claudean Pare  : 1980  MRN: [id number]  Referring provider: Debi Ferris  Dx:   Encounter Diagnosis     ICD-10-CM    1  Intercostal neuralgia  G58 8    2  Costochondritis  M94 0    3  Acute thoracic back pain, unspecified back pain laterality  M54 6                   Subjective: Pt reports pain yesterday in abdominals and reports pain/burning and in thoracic spine      Objective: See treatment diary below      Assessment: Pt reports less pain discomfort in abdominals and thoracic spine post manuals  Visual cues to ensure correct exercise technique with pelvic mobility on pball  Pt educated on the use of CP before bed to t/s to help control "burning"/pain  Progress as able  Plan: Continue with current POC to address pt deficits        Daily Treatment Diary    EPOC: 2021  Precautions: None  Co- Morbidities: Hx of panic attack    Manuals 3/9 3/11 3/15 3/18 3/23 3/25 3/30 4/1 4/6 4/13   P/a mobs to t/s Grade V RB- Grade V RB- grade V RB Grade V (pt consent given) RB Grade V (pt consent given) RB grade V (pt consent given) RB grade V (pt consent given) RB grade V (pt consent given) RB-grade V (pt consent given) RB-grade V(pt consent given)   TPR to b/l t/s psp NV RB RB RB RB TB RB TB TB TB   1st rib MWM NV RB RB RB NP NP NP NP NP NP   TPR to intercostals   RB RB RB TB RB TB TB TB   Assisted Breathing-opening rib cage   RB RB NP NP       Total Time 8' 14' 27' 28' 26' 30' 30' 26' 24' 25'   There Ex    HEP instruct/handout/pt ed 8'    5' on t/s anatomy, pain/spasm/pain s cycles, expectations w/ MRI Re-eval 10'       T/s rotations  1-2"x10 1-2"x10 1-2"x10   1-2"x10 ea bl/l  1-2"x10 ea b/l 1-2"x10 ea b/l 1-2"x10 ea b/l   T/s extension over 1/2 foam roller  10"x10 10"x10 10"x10   10"x10 10"x10 10"x10 10"x10   Extension stretch over pball          30"x4   pec stretch  30"x3 30"x3 NP- resume nv 30"x3 30"x3 NP- resume nv 30"x3 30"x4    Neuro-Re-ed UBE- retro   5' 5' 5' NP-resume NV 5' NP-resume NV 5' NP-resume NV   DLS: isometric abdominals- TA  10"x10 NP resume nv T/o TE         DLS: fall outs w/ TA contraction  10x ea b/l  NP- resum env 10x ea          DLS: marches w/ TA contraction  10x ea b/l NP- resume nv NV         Pball PF recruitment  nv NV NV   NV 10"x10 10"x10 10"x10   Sit to stands w/ PF/TA recruitment       NV NV NV    Pball pelvic mobility    NV   NV 15x a/p, m/l 20x a/p, m/l 20x a/p, m/l   Diaphragmatic breathing  20x 20x 20x  20x 20x 20x NP-resume NV    LPD       YTB 15x NP-resume NV                               Ther Activity                              Gait Training                              Modalities    CP PRN Home if needed            MHP     10' post UBE prior to manuals/stretches 10' post prior to manuals/stretches NP- resume nv PRN 5' 5' prio to manuals 5' prior to manuals

## 2021-04-15 ENCOUNTER — OFFICE VISIT (OUTPATIENT)
Dept: PHYSICAL THERAPY | Facility: CLINIC | Age: 41
End: 2021-04-15
Payer: COMMERCIAL

## 2021-04-15 DIAGNOSIS — M94.0 COSTOCHONDRITIS: ICD-10-CM

## 2021-04-15 DIAGNOSIS — G58.8 INTERCOSTAL NEURALGIA: Primary | ICD-10-CM

## 2021-04-15 DIAGNOSIS — M54.6 ACUTE THORACIC BACK PAIN, UNSPECIFIED BACK PAIN LATERALITY: ICD-10-CM

## 2021-04-15 PROCEDURE — 97112 NEUROMUSCULAR REEDUCATION: CPT | Performed by: PHYSICAL THERAPIST

## 2021-04-15 PROCEDURE — 97140 MANUAL THERAPY 1/> REGIONS: CPT | Performed by: PHYSICAL THERAPIST

## 2021-04-15 PROCEDURE — 97110 THERAPEUTIC EXERCISES: CPT | Performed by: PHYSICAL THERAPIST

## 2021-04-15 NOTE — PROGRESS NOTES
Daily Note     Today's date: 4/15/2021  Patient name: Randi Pollard  : 1980  MRN: [id number]  Referring provider: Hilary Carlson  Dx:   Encounter Diagnosis     ICD-10-CM    1  Intercostal neuralgia  G58 8    2  Acute thoracic back pain, unspecified back pain laterality  M54 6    3  Costochondritis  M94 0                   Subjective: Pt reports that pain overall is improving ,but very slow  Pt frustrated tht she is still experiencing pain even though she understands nerve issues tke increased time to heal       Objective: See treatment diary below      Assessment: Tx session tolerated well  Pt reports improved pain level/tightness afterwards  Plan: Continue with current POC to address pt deficits        Daily Treatment Diary    EPOC: 2021  Precautions: None  Co- Morbidities: Hx of panic attack    Manuals 3/23 3/25 3/30 4/1 4/6 4/13 4/15     P/a mobs to t/s RB Grade V (pt consent given) RB grade V (pt consent given) RB grade V (pt consent given) RB grade V (pt consent given) RB-grade V (pt consent given) RB-grade V(pt consent given) RB-grade V(pt consent given)     TPR to b/l t/s psp RB TB RB TB TB TB RB     1st rib MWM NP NP NP NP NP NP      TPR to intercostals RB TB RB TB TB TB RB     Assisted Breathing-opening rib cage NP NP          Total Time 32' 30' 30' 26' 24' 25' 25'     There Ex       HEP instruct/handout/pt ed 5' on t/s anatomy, pain/spasm/pain s cycles, expectations w/ MRI Re-eval 10'          T/s rotations   1-2"x10 ea bl/l  1-2"x10 ea b/l 1-2"x10 ea b/l 1-2"x10 ea b/l 1-2"x10 ea b/l      T/s extension over 1/2 foam roller   10"x10 10"x10 10"x10 10"x10 10"x10      Extension stretch over pball      30"x4 30"x4     pec stretch 30"x3 30"x3 NP- resume nv 30"x3 30"x4       Neuro-Re-ed      UBE- retro 5' NP-resume NV 5' NP-resume NV 5' NP-resume NV 5'     DLS: isometric abdominals- TA            DLS: fall outs w/ TA contraction            DLS:  w/ TA contraction            [employer] recruitment   NV 10"x10 10"x10 10"x10 10"x10     Sit to stands w/ PF/TA recruitment   NV NV NV       Pball pelvic mobility   NV 15x a/p, m/l 20x a/p, m/l 20x a/p, m/l 20x a/p andm/l      Diaphragmatic breathing  20x 20x 20x NP-resume NV       LPD   YTB 15x NP-resume NV                                Ther Activity                              Gait Training                              Modalities      CP PRN            MHP 10' post UBE prior to manuals/stretches 10' post prior to manuals/stretches NP- resume nv PRN 5' 5' prio to manuals 5' prior to manuals  5'prior to [facility]

## 2021-04-20 ENCOUNTER — OFFICE VISIT (OUTPATIENT)
Dept: PHYSICAL THERAPY | Facility: CLINIC | Age: 41
End: 2021-04-20
Payer: COMMERCIAL

## 2021-04-20 DIAGNOSIS — M94.0 COSTOCHONDRITIS: ICD-10-CM

## 2021-04-20 DIAGNOSIS — M54.6 ACUTE THORACIC BACK PAIN, UNSPECIFIED BACK PAIN LATERALITY: ICD-10-CM

## 2021-04-20 DIAGNOSIS — G58.8 INTERCOSTAL NEURALGIA: Primary | ICD-10-CM

## 2021-04-20 PROCEDURE — 97112 NEUROMUSCULAR REEDUCATION: CPT | Performed by: PHYSICAL THERAPIST

## 2021-04-20 PROCEDURE — 97140 MANUAL THERAPY 1/> REGIONS: CPT | Performed by: PHYSICAL THERAPIST

## 2021-04-20 NOTE — PROGRESS NOTES
Discharge/Daily Note     Today's date: 2021  Patient name: Dm Cabrera  : 1980  MRN: [id number]  Referring provider: Clarice Syed  Dx:   Encounter Diagnosis     ICD-10-CM    1  Intercostal neuralgia  G58 8    2  Acute thoracic back pain, unspecified back pain laterality  M54 6    3  Costochondritis  M94 0                   Subjective: Pt feels that she has noticed some improvement over the weekend  She reports that she was planting flowers for approximately 1 hour and had pain by the end- she reports that she was able to lie down for a short period of time which helped w/ pain  She reports that tightness in abdomen is improving and t/s pain is still present but less intense/less often  Objective: See treatment diary below      Assessment: Tx session tolerated well  Mostly manuals done today  Discussed transition to updated HEP as pt reports feeling ready for this trnsition  Plan: d/c from skilled PT services  Pt will transition to updated HEP and encouraged to contact clinic w/ any questions/concerns upon d/c        Daily Treatment Diary    EPOC: 2021  Precautions: None  Co- Morbidities: Hx of panic attack    Manuals 3/23 3/25 3/30 4/1 4/6 4/13 4/15 4/20    P/a mobs to t/s RB Grade V (pt consent given) RB grade V (pt consent given) RB grade V (pt consent given) RB grade V (pt consent given) RB-grade V (pt consent given) RB-grade V(pt consent given) RB-grade V(pt consent given) RB- grade V (pt consent given)    TPR to b/l t/s psp RB TB RB TB TB TB RB RB    1st rib MWM NP NP NP NP NP NP  np    TPR to intercostals RB TB RB TB TB TB RB RB    Assisted Breathing-opening rib cage NP NP          Total Time 32' 30' 30' 26' 24' 25' 25' 29'    There Ex       HEP instruct/handout/pt ed 5' on t/s anatomy, pain/spasm/pain s cycles, expectations w/ MRI Re-eval 10'          T/s rotations   1-2"x10 ea bl/l  1-2"x10 ea b/l 1-2"x10 ea b/l 1-2"x10 ea b/l 1-2"x10 ea b/l      T/s extension over 1/2 foam roller   10"x10 10"x10 10"x10 10"x10 10"x10      Extension stretch over pball      30"x4 30"x4 30"x4    pec stretch 30"x3 30"x3 NP- resume nv 30"x3 30"x4       Neuro-Re-ed      UBE- retro 5' NP-resume NV 5' NP-resume NV 5' NP-resume NV 5' 5'    DLS: isometric abdominals- TA            DLS: fall outs w/ TA contraction            DLS: marches w/ TA contraction            Pball PF recruitment   NV 10"x10 10"x10 10"x10 10"x10 10"x10    Sit to stands w/ PF/TA recruitment   NV NV NV       Pball pelvic mobility   NV 15x a/p, m/l 20x a/p, m/l 20x a/p, m/l 20x a/p andm/l  20x a/p and m/l    Diaphragmatic breathing  20x 20x 20x NP-resume NV       LPD   YTB 15x NP-resume NV                                Ther Activity                              Gait Training                              Modalities      CP PRN            MHP 10' post UBE prior to manuals/stretches 10' post prior to manuals/stretches NP- resume nv PRN 5' 5' prio to manuals 5' prior to manuals  5'prior to [facility]

## 2021-04-22 ENCOUNTER — APPOINTMENT (OUTPATIENT)
Dept: PHYSICAL THERAPY | Facility: CLINIC | Age: 41
End: 2021-04-22
Payer: COMMERCIAL